# Patient Record
Sex: MALE | Race: ASIAN | NOT HISPANIC OR LATINO
[De-identification: names, ages, dates, MRNs, and addresses within clinical notes are randomized per-mention and may not be internally consistent; named-entity substitution may affect disease eponyms.]

---

## 2017-09-22 ENCOUNTER — APPOINTMENT (OUTPATIENT)
Dept: UROLOGY | Facility: CLINIC | Age: 67
End: 2017-09-22
Payer: MEDICARE

## 2017-09-22 VITALS
DIASTOLIC BLOOD PRESSURE: 68 MMHG | OXYGEN SATURATION: 96 % | BODY MASS INDEX: 29.09 KG/M2 | TEMPERATURE: 98.6 F | WEIGHT: 197 LBS | HEART RATE: 60 BPM | RESPIRATION RATE: 17 BRPM | SYSTOLIC BLOOD PRESSURE: 135 MMHG

## 2017-09-22 DIAGNOSIS — R80.9 PROTEINURIA, UNSPECIFIED: ICD-10-CM

## 2017-09-22 DIAGNOSIS — N13.8 BENIGN PROSTATIC HYPERPLASIA WITH LOWER URINARY TRACT SYMPMS: ICD-10-CM

## 2017-09-22 DIAGNOSIS — N40.1 BENIGN PROSTATIC HYPERPLASIA WITH LOWER URINARY TRACT SYMPMS: ICD-10-CM

## 2017-09-22 PROCEDURE — 99214 OFFICE O/P EST MOD 30 MIN: CPT

## 2017-09-22 RX ORDER — AZILSARTAN KAMEDOXOMIL 80 MG/1
80 TABLET ORAL
Refills: 0 | Status: ACTIVE | COMMUNITY

## 2017-09-22 RX ORDER — CLONIDINE HYDROCHLORIDE 0.3 MG/1
TABLET ORAL
Refills: 0 | Status: ACTIVE | COMMUNITY

## 2017-10-03 LAB
ANION GAP SERPL CALC-SCNC: 14 MMOL/L
BUN SERPL-MCNC: 29 MG/DL
CALCIUM SERPL-MCNC: 9.4 MG/DL
CHLORIDE SERPL-SCNC: 105 MMOL/L
CO2 SERPL-SCNC: 25 MMOL/L
CREAT SERPL-MCNC: 1.74 MG/DL
GLUCOSE SERPL-MCNC: 92 MG/DL
POTASSIUM SERPL-SCNC: 4.8 MMOL/L
PSA FREE FLD-MCNC: 19
PSA FREE SERPL-MCNC: 0.6 NG/ML
PSA SERPL-MCNC: 3.15 NG/ML
SODIUM SERPL-SCNC: 144 MMOL/L

## 2017-10-27 ENCOUNTER — APPOINTMENT (OUTPATIENT)
Dept: UROLOGY | Facility: CLINIC | Age: 67
End: 2017-10-27

## 2019-01-25 ENCOUNTER — APPOINTMENT (OUTPATIENT)
Dept: UROLOGY | Facility: CLINIC | Age: 69
End: 2019-01-25

## 2019-12-06 ENCOUNTER — APPOINTMENT (OUTPATIENT)
Dept: UROLOGY | Facility: CLINIC | Age: 69
End: 2019-12-06
Payer: MEDICARE

## 2019-12-06 VITALS
DIASTOLIC BLOOD PRESSURE: 75 MMHG | RESPIRATION RATE: 17 BRPM | OXYGEN SATURATION: 97 % | HEART RATE: 75 BPM | BODY MASS INDEX: 30.13 KG/M2 | SYSTOLIC BLOOD PRESSURE: 146 MMHG | TEMPERATURE: 97.8 F | WEIGHT: 204 LBS

## 2019-12-06 DIAGNOSIS — E83.52 HYPERCALCEMIA: ICD-10-CM

## 2019-12-06 DIAGNOSIS — K40.90 UNILATERAL INGUINAL HERNIA, W/OUT OBSTRUCTION OR GANGRENE, NOT SPECIFIED AS RECURRENT: ICD-10-CM

## 2019-12-06 PROCEDURE — 99213 OFFICE O/P EST LOW 20 MIN: CPT

## 2019-12-06 RX ORDER — AZILSARTAN KAMEDOXOMIL 80 MG/1
80 TABLET ORAL
Refills: 0 | Status: ACTIVE | COMMUNITY

## 2019-12-06 RX ORDER — METOPROLOL TARTRATE 75 MG/1
TABLET, FILM COATED ORAL
Refills: 0 | Status: ACTIVE | COMMUNITY

## 2019-12-06 RX ORDER — NIFEDIPINE 90 MG
90 TABLET, EXTENDED RELEASE ORAL
Refills: 0 | Status: ACTIVE | COMMUNITY

## 2019-12-06 RX ORDER — FUROSEMIDE 80 MG/1
TABLET ORAL
Refills: 0 | Status: ACTIVE | COMMUNITY

## 2019-12-06 NOTE — LETTER BODY
[FreeTextEntry1] : Hammad Rouse MD \par [address] [facility] [address]\[place] [address]\par [phone]\par [phone]\par \par Dear Dr. Rouse,\par \par Reason for Visit: BPH.\par \par This is a 69 year-old gentleman with history of hypercalcemia, atrial fibrillation, hypertension, presenting with symptoms of BPH and nocturia. He underwent thyroid resection. He was last seen by me 2 years ago. He returns today for follow up. Since his last visit, patient reports pedal edema and recent weight gain of 10 lbs. He has not been compliant with his all his medications. His urinalysis demonstrated persistent proteinuria and pyuria. He reports maintaining follow-up with nephrologist and cardiologist. He reports that he had heart failure last year. He denies any urinary difficulties during the daytime.  He denies any hematuria or urinary incontinence. His symptoms are aggravated by hydration. He denies any alleviating factors. He reports no pain. The past medical history, family history and social history are unchanged. All other review of systems are negative. Patient denies any changes in medications. Medication list was reconciled.\par \par On examination, the patient is a healthy-appearing gentleman in no acute distress. He is alert and oriented follows commands. He has normal mood and affect. He is normocephalic. Neck is supple. Oral no thrush Respirations are unlabored. His abdomen is soft and nontender. Bladder is nonpalpable. No CVA tenderness. Neurologically he is grossly intact. No peripheral edema. Skin without gross abnormality. He has peripheral edema. \par \par His urinalysis demonstrated proteinuria, 100 mg/dL, and pyuria, 0-4/HPF. His urine culture was negative. \par \par Previous post-void residual on bladder scan was 30 cc.\par \par ASSESSMENT: BPH. Nocturia. Proteinuria. \par \par I counseled the patient. In terms of his nocturia, it is secondary to his pedal edema and fluid circulation when in a supine position. I recommend he continue all his medications as directed by his cardiologist. Patient promises compliance. In terms of his proteinuria, I recommend the patient maintain follow-up with his nephrologist. Patient will also obtain PSA profile to ensure stability. Risks and alternatives were discussed. I answered the patient's questions. The patient will follow up as directed and will contact me with any questions or concerns. Thank you for the opportunity to participate in the care of Mr. BERNARDO. I will keep you updated on his progress.\par \par Plan: Continue medications. Consult nephrology and cardiology. Follow up as directed.

## 2019-12-06 NOTE — ADDENDUM
[FreeTextEntry1] : Entered by Albino Castillo, acting as scribe for Dr. Johan Rodriguez.\par \par The documentation recorded by the scribe accurately reflects the service I personally performed and the decisions made by me.

## 2019-12-07 LAB
PSA FREE FLD-MCNC: 28 %
PSA FREE SERPL-MCNC: 0.6 NG/ML
PSA SERPL-MCNC: 2.13 NG/ML

## 2020-06-19 ENCOUNTER — APPOINTMENT (OUTPATIENT)
Dept: UROLOGY | Facility: CLINIC | Age: 70
End: 2020-06-19
Payer: MEDICARE

## 2020-06-19 VITALS
WEIGHT: 202 LBS | RESPIRATION RATE: 17 BRPM | BODY MASS INDEX: 29.83 KG/M2 | DIASTOLIC BLOOD PRESSURE: 78 MMHG | HEART RATE: 68 BPM | OXYGEN SATURATION: 97 % | SYSTOLIC BLOOD PRESSURE: 138 MMHG | TEMPERATURE: 97.6 F

## 2020-06-19 DIAGNOSIS — I48.91 UNSPECIFIED ATRIAL FIBRILLATION: ICD-10-CM

## 2020-06-19 DIAGNOSIS — R93.429 ABNORMAL RADIOLOGIC FINDINGS ON DIAGNOSITIC IMAGING OF UNSPECIFIED KIDNEY: ICD-10-CM

## 2020-06-19 DIAGNOSIS — Z00.00 ENCOUNTER FOR GENERAL ADULT MEDICAL EXAMINATION W/OUT ABNORMAL FINDINGS: ICD-10-CM

## 2020-06-19 DIAGNOSIS — N28.1 CYST OF KIDNEY, ACQUIRED: ICD-10-CM

## 2020-06-19 PROCEDURE — 99214 OFFICE O/P EST MOD 30 MIN: CPT | Mod: 25

## 2020-06-19 PROCEDURE — 76870 US EXAM SCROTUM: CPT

## 2020-06-19 NOTE — HISTORY OF PRESENT ILLNESS
[FreeTextEntry1] : FUA BPH ON FLOMAX\par NOW RIGHT SIDE TESTIS SWELLING\par SAW GENERAL SURGERY NO SURGEON \par \par SCROTAL US\par \par bilateral varicocele\par previous negative renal US\par \par Please refer to URO Consult note

## 2020-06-19 NOTE — ADDENDUM
[FreeTextEntry1] : Entered by aDmion Schulz, acting as scribe for Dr. Johan Rodriguez.\par \par The documentation recorded by the scribe accurately reflects the service I personally performed and the decisions made by me.

## 2020-06-19 NOTE — LETTER BODY
[FreeTextEntry1] : Hammad Rouse MD \par [address] [facility] [address]\[place] [address]\par [phone]\par [phone]\par \par Dear Dr. Rouse,\par \par Reason for Visit: BPH.\par \par This is a 69 year-old gentleman with history of hypercalcemia, atrial fibrillation, hypertension, presenting with BPH and nocturia. Patient previously underwent thyroid resection. His abdominal ultrasound in December 2019 was negative. He returns today for follow up. Since his last visit, he reports of right testis swelling. The patient has seen a general surgeon, but is not going to proceed with surgery. The patient reports taking Flomax regularly without any side effects or difficulties. He denies any hematuria, dysuria, or daytime urinary symptoms. He continues to consult with his nephrologist and cardiologist. He reports no pain. The past medical history, family history and social history are unchanged. All other review of systems are negative. Patient denies any changes in medications. Medication list was reconciled.\par \par On examination, the patient is a healthy-appearing gentleman in no acute distress. He is alert and oriented follows commands. He has normal mood and affect. He is normocephalic. Neck is supple. Oral no thrush Respirations are unlabored. His abdomen is soft and nontender. Bladder is nonpalpable. No CVA tenderness. Neurologically he is grossly intact. No peripheral edema. Skin without gross abnormality. He has peripheral edema. He has normal male external genitalia. Normal meatus. Bilateral testes are descended intrascrotally. There is right testis edema. On rectal examination, there is normal sphincter tone. The prostate is clinically benign without focal induration or nodularity. \par \par His BMP demonstrated decreased renal functions, creatinine 1.46. His PSA in December 2019 was 2.13, which is within normal limits. His urinalysis was unremarkable. \par \par ASSESSMENT: BPH. Nocturia. Proteinuria, resolved. Right testis edema.\par \par I counseled the patient. I reassured him. In terms of his right testis swelling, he will obtain scrotal ultrasound today to further evaluate. In terms of his BPH and nocturia, I encourage the patient continue to take Flomax to treat his symptoms. In terms of his decreased renal functions, I advise the patient maintain follow-up with his nephrologist. Risks and alternatives were discussed. I answered the patient's questions. The patient will follow up as directed and will contact me with any questions or concerns. Thank you for the opportunity to participate in the care of Mr. BERNARDO. I will keep you updated on his progress.\par \par Plan: Continue Flomax. Scrotal US. Follow up as directed.\par \par I personally reviewed scrotal ultrasound with the patient today. Images demonstrated bilateral varicoceles. There was no evidence of intratesticular masses, microcalcifications, or other abnormalities visualized.

## 2021-10-01 ENCOUNTER — APPOINTMENT (OUTPATIENT)
Dept: UROLOGY | Facility: CLINIC | Age: 71
End: 2021-10-01
Payer: MEDICARE

## 2021-10-01 VITALS
RESPIRATION RATE: 16 BRPM | WEIGHT: 199 LBS | BODY MASS INDEX: 29.39 KG/M2 | HEART RATE: 72 BPM | SYSTOLIC BLOOD PRESSURE: 132 MMHG | OXYGEN SATURATION: 97 % | TEMPERATURE: 97.8 F | DIASTOLIC BLOOD PRESSURE: 79 MMHG

## 2021-10-01 PROCEDURE — 99213 OFFICE O/P EST LOW 20 MIN: CPT

## 2021-10-01 NOTE — HISTORY OF PRESENT ILLNESS
[FreeTextEntry1] : Please refer to URO Consult note \par \par fu bph \par psa 2.9 \par flomax bid \par continue medication \par come back in 1 year \par

## 2021-10-01 NOTE — LETTER BODY
[FreeTextEntry1] : Hammad Rouse MD \par [address] [facility] [address]\[place] [address]\par [phone]\par [phone]\par \par Dear Dr. Rouse,\par \par Reason for Visit: BPH.\par \par This is a 71 year-old gentleman with history of hypercalcemia, atrial fibrillation, hypertension, bilateral varioceles presenting with BPH and nocturia. Patient previously underwent thyroid resection. His abdominal ultrasound in December 2019 was negative. His scrotal ultrasound from June 2020 demonstrated bilateral varicoceles. He returns today for follow up. Since his last visit, the patient reports taking Flomax BID regularly without any side effects or difficulties. He denies any hematuria, dysuria, or daytime urinary symptoms. He continues to consult with his nephrologist and cardiologist. He reports no pain. The past medical history, family history and social history are unchanged. All other review of systems are negative. Patient denies any changes in medications. Medication list was reconciled.\par \par On examination, the patient is a healthy-appearing gentleman in no acute distress. He is alert and oriented follows commands. He has normal mood and affect. He is normocephalic. Neck is supple. Oral no thrush Respirations are unlabored. His abdomen is soft and nontender. Bladder is nonpalpable. No CVA tenderness. Neurologically he is grossly intact. No peripheral edema. Skin without gross abnormality. He has peripheral edema. He has normal male external genitalia. Normal meatus. Bilateral testes are descended intrascrotally. There is right testis edema. On rectal examination, there is normal sphincter tone. The prostate is clinically benign without focal induration or nodularity. \par \par His BMP demonstrated normal renal functions, creatinine 1.29. His PSA was 2.90, which is within normal limits. His urinalysis demonstrated trace protein in urine. \par \par ASSESSMENT: BPH. Nocturia. Proteinuria. Right testis edema.\par \par I counseled the patient. I reassured him. In terms of his BPH and nocturia, the patient notes stable urinary symptoms on Flomax BID. I renewed the patient's prescription for Flomax today. I encouraged the patient to continue medications regularly as directed.  In terms of his proteinuria,  I advise the patient maintain follow-up with his nephrologist. Patient understands that if he develops gross hematuria or any urinary discomfort, he will contact me for further evaluation.  Risks and alternatives were discussed. I answered the patient's questions. The patient will follow up as directed and will contact me with any questions or concerns. Thank you for the opportunity to participate in the care of Mr. BERNARDO. I will keep you updated on his progress.\par \par Plan: Continue Flomax BID. Follow up in 1 year. \par

## 2021-10-01 NOTE — ADDENDUM
[FreeTextEntry1] : Entered by Kandice Page, acting as scribe for Dr. Johan Rodriguez.\par \par The documentation recorded by the scribe accurately reflects the service I personally performed and the decisions made by me.